# Patient Record
Sex: MALE | Race: WHITE | Employment: FULL TIME | URBAN - NONMETROPOLITAN AREA
[De-identification: names, ages, dates, MRNs, and addresses within clinical notes are randomized per-mention and may not be internally consistent; named-entity substitution may affect disease eponyms.]

---

## 2018-03-14 ENCOUNTER — HOSPITAL ENCOUNTER (INPATIENT)
Age: 68
LOS: 1 days | Discharge: HOME | DRG: 066 | End: 2018-03-15
Attending: PSYCHIATRY & NEUROLOGY | Admitting: PSYCHIATRY & NEUROLOGY
Payer: COMMERCIAL

## 2018-03-14 DIAGNOSIS — I63.9 STROKE OF UNKNOWN ETIOLOGY (HCC): Primary | ICD-10-CM

## 2018-03-14 LAB — GLUCOSE BLD-MCNC: 109 MG/DL (ref 70–108)

## 2018-03-14 PROCEDURE — 82948 REAGENT STRIP/BLOOD GLUCOSE: CPT

## 2018-03-14 PROCEDURE — 1210000002 HC MED SURG R&B - NEUROSCIENCE

## 2018-03-14 RX ORDER — GLYBURIDE 5 MG/1
5 TABLET ORAL 2 TIMES DAILY WITH MEALS
Status: DISCONTINUED | OUTPATIENT
Start: 2018-03-15 | End: 2018-03-15 | Stop reason: HOSPADM

## 2018-03-14 RX ORDER — AMLODIPINE BESYLATE 10 MG/1
10 TABLET ORAL DAILY
Status: DISCONTINUED | OUTPATIENT
Start: 2018-03-15 | End: 2018-03-15 | Stop reason: HOSPADM

## 2018-03-14 RX ORDER — SODIUM CHLORIDE 9 MG/ML
INJECTION, SOLUTION INTRAVENOUS CONTINUOUS
Status: DISCONTINUED | OUTPATIENT
Start: 2018-03-14 | End: 2018-03-15 | Stop reason: HOSPADM

## 2018-03-14 RX ORDER — GLYBURIDE 5 MG/1
5 TABLET ORAL 2 TIMES DAILY WITH MEALS
COMMUNITY

## 2018-03-14 RX ORDER — LANOLIN ALCOHOL/MO/W.PET/CERES
500 CREAM (GRAM) TOPICAL DAILY
COMMUNITY

## 2018-03-14 RX ORDER — AMLODIPINE BESYLATE 10 MG/1
10 TABLET ORAL DAILY
COMMUNITY

## 2018-03-14 RX ORDER — ASPIRIN 81 MG/1
81 TABLET, CHEWABLE ORAL DAILY
Status: DISCONTINUED | OUTPATIENT
Start: 2018-03-15 | End: 2018-03-15 | Stop reason: SDUPTHER

## 2018-03-14 RX ORDER — LANOLIN ALCOHOL/MO/W.PET/CERES
500 CREAM (GRAM) TOPICAL DAILY
Status: DISCONTINUED | OUTPATIENT
Start: 2018-03-15 | End: 2018-03-15 | Stop reason: HOSPADM

## 2018-03-15 ENCOUNTER — APPOINTMENT (OUTPATIENT)
Dept: MRI IMAGING | Age: 68
DRG: 066 | End: 2018-03-15
Attending: PSYCHIATRY & NEUROLOGY
Payer: COMMERCIAL

## 2018-03-15 VITALS
SYSTOLIC BLOOD PRESSURE: 143 MMHG | TEMPERATURE: 98.8 F | DIASTOLIC BLOOD PRESSURE: 83 MMHG | HEIGHT: 67 IN | BODY MASS INDEX: 24.48 KG/M2 | RESPIRATION RATE: 18 BRPM | OXYGEN SATURATION: 96 % | WEIGHT: 156 LBS | HEART RATE: 77 BPM

## 2018-03-15 LAB
ALBUMIN SERPL-MCNC: 4.6 G/DL (ref 3.5–5.1)
ALP BLD-CCNC: 80 U/L (ref 38–126)
ALT SERPL-CCNC: 22 U/L (ref 11–66)
ANION GAP SERPL CALCULATED.3IONS-SCNC: 15 MEQ/L (ref 8–16)
AST SERPL-CCNC: 18 U/L (ref 5–40)
BILIRUB SERPL-MCNC: 0.6 MG/DL (ref 0.3–1.2)
BUN BLDV-MCNC: 15 MG/DL (ref 7–22)
CALCIUM SERPL-MCNC: 9.9 MG/DL (ref 8.5–10.5)
CHLORIDE BLD-SCNC: 98 MEQ/L (ref 98–111)
CHOLESTEROL, FASTING: 185 MG/DL (ref 100–199)
CO2: 27 MEQ/L (ref 23–33)
CREAT SERPL-MCNC: 0.7 MG/DL (ref 0.4–1.2)
GFR SERPL CREATININE-BSD FRML MDRD: > 90 ML/MIN/1.73M2
GLUCOSE BLD-MCNC: 120 MG/DL (ref 70–108)
GLUCOSE BLD-MCNC: 193 MG/DL (ref 70–108)
GLUCOSE BLD-MCNC: 86 MG/DL (ref 70–108)
HCT VFR BLD CALC: 43.9 % (ref 42–52)
HDLC SERPL-MCNC: 46 MG/DL
HEMOGLOBIN: 15 GM/DL (ref 14–18)
INR BLD: 1.05 (ref 0.85–1.13)
LDL CHOLESTEROL CALCULATED: 115 MG/DL
MAGNESIUM: 2.1 MG/DL (ref 1.6–2.4)
MCH RBC QN AUTO: 32.2 PG (ref 27–31)
MCHC RBC AUTO-ENTMCNC: 34.2 GM/DL (ref 33–37)
MCV RBC AUTO: 94.3 FL (ref 80–94)
PDW BLD-RTO: 14.2 % (ref 11.5–14.5)
PHOSPHORUS: 3 MG/DL (ref 2.4–4.7)
PLATELET # BLD: 281 THOU/MM3 (ref 130–400)
PMV BLD AUTO: 7.2 FL (ref 7.4–10.4)
POTASSIUM SERPL-SCNC: 4.1 MEQ/L (ref 3.5–5.2)
RBC # BLD: 4.66 MILL/MM3 (ref 4.7–6.1)
SODIUM BLD-SCNC: 140 MEQ/L (ref 135–145)
TOTAL PROTEIN: 7.7 G/DL (ref 6.1–8)
TRIGLYCERIDE, FASTING: 118 MG/DL (ref 0–199)
TSH SERPL DL<=0.05 MIU/L-ACNC: 3.07 UIU/ML (ref 0.4–4.2)
WBC # BLD: 10.4 THOU/MM3 (ref 4.8–10.8)

## 2018-03-15 PROCEDURE — 84100 ASSAY OF PHOSPHORUS: CPT

## 2018-03-15 PROCEDURE — 36415 COLL VENOUS BLD VENIPUNCTURE: CPT

## 2018-03-15 PROCEDURE — 85610 PROTHROMBIN TIME: CPT

## 2018-03-15 PROCEDURE — 70553 MRI BRAIN STEM W/O & W/DYE: CPT

## 2018-03-15 PROCEDURE — G8988 SELF CARE GOAL STATUS: HCPCS

## 2018-03-15 PROCEDURE — 84443 ASSAY THYROID STIM HORMONE: CPT

## 2018-03-15 PROCEDURE — 6370000000 HC RX 637 (ALT 250 FOR IP): Performed by: PSYCHIATRY & NEUROLOGY

## 2018-03-15 PROCEDURE — 82948 REAGENT STRIP/BLOOD GLUCOSE: CPT

## 2018-03-15 PROCEDURE — 2580000003 HC RX 258: Performed by: PSYCHIATRY & NEUROLOGY

## 2018-03-15 PROCEDURE — A9579 GAD-BASE MR CONTRAST NOS,1ML: HCPCS | Performed by: PSYCHIATRY & NEUROLOGY

## 2018-03-15 PROCEDURE — 97166 OT EVAL MOD COMPLEX 45 MIN: CPT

## 2018-03-15 PROCEDURE — 80061 LIPID PANEL: CPT

## 2018-03-15 PROCEDURE — 85027 COMPLETE CBC AUTOMATED: CPT

## 2018-03-15 PROCEDURE — 80053 COMPREHEN METABOLIC PANEL: CPT

## 2018-03-15 PROCEDURE — 83735 ASSAY OF MAGNESIUM: CPT

## 2018-03-15 PROCEDURE — 6360000004 HC RX CONTRAST MEDICATION: Performed by: PSYCHIATRY & NEUROLOGY

## 2018-03-15 PROCEDURE — 99253 IP/OBS CNSLTJ NEW/EST LOW 45: CPT | Performed by: PSYCHIATRY & NEUROLOGY

## 2018-03-15 PROCEDURE — G8987 SELF CARE CURRENT STATUS: HCPCS

## 2018-03-15 RX ORDER — ACETAMINOPHEN 325 MG/1
650 TABLET ORAL EVERY 4 HOURS PRN
Status: DISCONTINUED | OUTPATIENT
Start: 2018-03-15 | End: 2018-03-15 | Stop reason: HOSPADM

## 2018-03-15 RX ORDER — SIMVASTATIN 20 MG
20 TABLET ORAL NIGHTLY
Status: DISCONTINUED | OUTPATIENT
Start: 2018-03-15 | End: 2018-03-15 | Stop reason: HOSPADM

## 2018-03-15 RX ORDER — ASPIRIN 81 MG/1
81 TABLET ORAL DAILY
Status: DISCONTINUED | OUTPATIENT
Start: 2018-03-15 | End: 2018-03-15 | Stop reason: HOSPADM

## 2018-03-15 RX ORDER — SODIUM CHLORIDE 0.9 % (FLUSH) 0.9 %
10 SYRINGE (ML) INJECTION EVERY 12 HOURS SCHEDULED
Status: DISCONTINUED | OUTPATIENT
Start: 2018-03-15 | End: 2018-03-15 | Stop reason: HOSPADM

## 2018-03-15 RX ORDER — ONDANSETRON 2 MG/ML
4 INJECTION INTRAMUSCULAR; INTRAVENOUS EVERY 6 HOURS PRN
Status: DISCONTINUED | OUTPATIENT
Start: 2018-03-15 | End: 2018-03-15 | Stop reason: HOSPADM

## 2018-03-15 RX ORDER — SODIUM CHLORIDE 0.9 % (FLUSH) 0.9 %
10 SYRINGE (ML) INJECTION PRN
Status: DISCONTINUED | OUTPATIENT
Start: 2018-03-15 | End: 2018-03-15 | Stop reason: HOSPADM

## 2018-03-15 RX ADMIN — Medication 10 ML: at 09:47

## 2018-03-15 RX ADMIN — Medication 500 MG: at 09:48

## 2018-03-15 RX ADMIN — GLYBURIDE 5 MG: 5 TABLET ORAL at 09:47

## 2018-03-15 RX ADMIN — GADOTERIDOL 15 ML: 279.3 INJECTION, SOLUTION INTRAVENOUS at 09:40

## 2018-03-15 RX ADMIN — METFORMIN HYDROCHLORIDE 500 MG: 500 TABLET ORAL at 09:48

## 2018-03-15 RX ADMIN — AMLODIPINE BESYLATE 10 MG: 10 TABLET ORAL at 09:47

## 2018-03-15 RX ADMIN — ASPIRIN 81 MG: 81 TABLET, COATED ORAL at 13:28

## 2018-03-15 ASSESSMENT — PAIN SCALES - GENERAL: PAINLEVEL_OUTOF10: 0

## 2018-03-15 NOTE — DISCHARGE SUMMARY
Neurology/Neurointerventional Discharge Summary      Admit Date:  3/14/2018    Discharge Date: 3/15/2018    PCP: Waldemar Abbott MD     Admission Diagnosis: Stroke of unknown etiology ([facility] [address] [I63.9]  Discharge Diagnosis: Same  Discharge Medications:   Agapito Campbell, [address] Medication Instructions PNB:[id number]    Printed on:03/15/18 7713   Medication Information                      amLODIPine (NORVASC) 10 MG tablet  Take 10 mg by mouth daily             aspirin 81 MG tablet  Take 81 mg by mouth daily             glyBURIDE (DIABETA) 5 MG tablet  Take 5 mg by mouth 2 times daily (with meals)             metFORMIN (GLUCOPHAGE) 500 MG tablet  Take 500 mg by mouth 2 times daily (with meals)             niacin 500 MG extended release capsule  Take 500 mg by mouth daily                   History of Present Illness: Please see HPI from History and Physical Note. Brief Hospital Course:  Pt was admitted for possible recurrent stroke. He had memory loss lasting for 1 hour. He still has amnesia during this time. He is back to his new baseline. MRI w/ subacute infarct and petechial hemorrhage (known). Physical Examination at Time of Discharge:  Vitals:    03/15/18 1640   BP: (!) 143/83   Pulse: 77   Resp: 18   Temp: 98.8 °F (37.1 °C)   SpO2: 96%       Neuro: A+Ox3, expressive dysphasia, mild  CN 2-12 intact   Antigravity x4, slightly weaker on R, pronator drift  Sensory/Reflexes unchanged    Labs and Imaging have been reviewed.   Lab Results   Component Value Date    WBC 10.4 03/15/2018    HGB 15.0 03/15/2018    HCT 43.9 03/15/2018    MCV 94.3 03/15/2018     03/15/2018     Lab Results   Component Value Date     03/15/2018    K 4.1 03/15/2018    CL 98 03/15/2018    CO2 27 03/15/2018    PHOS 3.0 03/15/2018    BUN 15 03/15/2018    CREATININE 0.7 03/15/2018    CALCIUM 9.9 03/15/2018     Lab Results   Component Value Date    INR 1.05 03/15/2018     No results found for: APTT  Lab Results   Component

## 2018-03-15 NOTE — PROGRESS NOTES
Dr Jesus Walsh paged with MRI results. No evidence of acute infarct. He read message at 1038. No new orders received.

## 2018-03-15 NOTE — PROGRESS NOTES
Patient admitted from [facility] for TIA/CVA work-up after a code stroke in their E.R. Around [phone] today patient began having altered mental status, with no recollection of previous admission to [place] for an acute CVA. Patient previously went to [facility] for AMS, slurred speech, facial droop, received TPA and was transferred to [place] in February 2018. Patient arrived with LACP, 20 gauge IV INT in left forearm. Ambulated with stand-by assist to room, gait steady. Wife(Sonja) and daughter Rin Bello) arrived shortly after with patient's home medications. Home medications placed back in wife's purse to be taken home. Dr. Toi Flanagan notified for orders (see eMAR and active order list).

## 2018-03-15 NOTE — CARE COORDINATION
3/15/18, 10:29 AM      Genny Smith       Admitted from: [facility] 3/14/2018/ 2123 Hospital day: 1   Location: [facility]09/009-A Reason for admit: Stroke of unknown etiology [facility]) [I63.9] Status: IP  Admit order signed?: no. Sticky note left for physician. PMH:  has a past medical history of Cerebral artery occlusion with cerebral infarction ([facility] [address] Diabetes mellitus ([facility] [address] Hyperlipidemia; and Hypertension. Procedure: none  Pertinent abnormal Imaging:MRI Brain  Impression:           1. Wedge-shaped subacute infarct in the posterior left frontal lobe. There is some associated petechial hemorrhage and mild enhancement. 2. No evidence of an acute infarct. 3. Mild severity chronic small vessel ischemic changes. Medications:  Scheduled Meds:   sodium chloride flush  10 mL Intravenous 2 times per day    enoxaparin  40 mg Subcutaneous Daily    aspirin  81 mg Oral Daily    simvastatin  20 mg Oral Nightly    amLODIPine  10 mg Oral Daily    glyBURIDE  5 mg Oral BID WC    metFORMIN  500 mg Oral BID WC    niacin  500 mg Oral Daily     Continuous Infusions:   sodium chloride        Pertinent Info/Orders/Treatment Plan:  PT/OT, telemetry, neuro checks  Diet: DIET CARB CONTROL;   DVT Prophylaxis: Lovenox  Smoking status:  reports that he quit smoking about 4 weeks ago. His smoking use included Cigarettes. He has a 80.00 pack-year smoking history.  He has never used smokeless tobacco.   Influenza Vaccination Screening Completed: yes  Pneumonia Vaccination Screening Completed: yes  Core measures: CVA core measures:  Dysphagia screen prior to PO intake-yes  TPA considered-no  Antithrombotic by day 2-yes  Lipid panel within 24 hours-yes  Rehab plan-PT/OT  [employer] education folder given to Primary RN  Discharge needs:  Antithrombotic script  Statin if LDL> 70 simvastatin 20 mg  PCP: Ignacio Lang MD  Readmission:   no  Risk Score: 14.25     Discharge Planning  Current Residence:  Private Residence  Living Arrangements:  Spouse/Significant Other   Support Systems:  Spouse/Significant Other, Children  Current Services PTA:     Potential Assistance Needed:  N/A  Potential Assistance Purchasing Medications:  No  Does patient want to participate in local refill/ meds to beds program?  N/A  Type of Home Care Services:  None  Patient expects to be discharged to:  Home with wife  Expected Discharge date:  03/16/18  Follow Up Appointment: Best Day/ Time: Wednesday AM    Discharge Plan: Met with Tequila Moon. He currently lives at home with spouse. Plan is to return at discharge. Denies need for DME or HH.      Evaluation: no

## 2018-03-15 NOTE — PROGRESS NOTES
[address] 61  INPATIENT OCCUPATIONAL THERAPY  [facility] 4A  EVALUATION    Time:  Time In: 0840  Time Out: 7751  Timed Code Treatment Minutes: 0 Minutes  Minutes: 12          Date: 3/15/2018  Patient Name: Rk Maciel,   Gender: male      MRN: [id number]  : 1950  (78 y.o.)  Referring Practitioner: Dr. Karsten Arzola  Diagnosis: Stroke  Additional Pertinent Hx: Per ER note on 3/14/18: Patient admitted from [facility] for TIA/CVA work-up after a code stroke in their E.R. Around 36 today patient began having altered mental status, with no recollection of previous admission to [facility] for an acute CVA. Patient previously went to [facility] for AMS, slurred speech, facial droop, received TPA and was transferred to [facility] in 2018.     Restrictions/Precautions:  Restrictions/Precautions: Fall Risk  Other position/activity restrictions: expressive aphasia    Past Medical History:   Diagnosis Date    [facility]bral [facility] occlusion with [facility] infarction [facility] [address]     Diabetes mellitus ([facility] [facility]     Hyperlipidemia     Hypertension      Past Surgical History:   Procedure Laterality Date    APPENDECTOMY      when he was 6years old           Subjective  Chart Reviewed: Yes (orders, progress notes)  Patient assessed for rehabilitation services?: Yes  Family / Caregiver Present: No    Subjective: cooperative, pleasant    General:       Vision:  (Pt reports 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

## 2018-03-15 NOTE — H&P
glyBURIDE (DIABETA) 5 MG tablet Take 5 mg by mouth 2 times daily (with meals)   Yes Historical Provider, MD   metFORMIN (GLUCOPHAGE) 500 MG tablet Take 500 mg by mouth 2 times daily (with meals)   Yes Historical Provider, MD   amLODIPine (NORVASC) 10 MG tablet Take 10 mg by mouth daily   Yes Historical Provider, MD       Past Medical History:   has a past medical history of Cerebral artery occlusion with cerebral infarction ([facility] [address] Diabetes mellitus ([facility] [address] Hyperlipidemia; and Hypertension. Past Surgical History:   has a past surgical history that includes Appendectomy. Social History:  Yes, quit recently Tobacco, no EtOH, no Illicit drugs    Family History:  No compelling family history of early neurodegenerative disease or cryptogenic stroke.     Physical Exam:  Vitals:    03/15/18 1640   BP: (!) 143/83   Pulse: 77   Resp: 18   Temp: 98.8 °F (37.1 °C)   SpO2: 96%     NAD  NC/AT, Midline nasal septum  No scleral icterus  No JVD  No increased work of breathing  +BS, NT/ND  No clubbing/cyanosis  No palpable lymphadenopathy appreciated  Neurologic:   A+Ox3, mild expressive dysphasia  CN2-12 intact, R facial droop  Antigravity x4, weaker on R, pronator drift  Gait deferred    Brief labs  Lab Results   Component Value Date    WBC 10.4 03/15/2018    HGB 15.0 03/15/2018    HCT 43.9 03/15/2018    MCV 94.3 03/15/2018     03/15/2018     Lab Results   Component Value Date     03/15/2018    K 4.1 03/15/2018    CL 98 03/15/2018    CO2 27 03/15/2018    PHOS 3.0 03/15/2018    BUN 15 03/15/2018    CREATININE 0.7 03/15/2018    CALCIUM 9.9 03/15/2018     Lab Results   Component Value Date    INR 1.05 03/15/2018     No results found for: APTT  Lab Results   Component Value Date    ALKPHOS 80 03/15/2018    ALT 22 03/15/2018    AST 18 03/15/2018    BILITOT 0.6 03/15/2018    LABALBU 4.6 03/15/2018     No results found for: TROPONINI   No results found for: LABA1C    No results found for: CHARCSF, CULTURE  No results

## 2018-03-16 NOTE — CARE COORDINATION
3/16/18, 7:07 AM  Pt. Discharged to home. Denies needs. Discharge plan discussed by  and . Discharge plan reviewed with patient/ family. Patient/ family verbalize understanding of discharge plan and are in agreement with plan. Understanding was demonstrated using the teach back method.        IMM Letter  IMM Letter given to Patient/Family/Significant other/Guardian/POA/by[de-identified] CM  IMM Letter date given[de-identified] 03/15/18  IMM Letter time given[de-identified] 0590